# Patient Record
Sex: FEMALE | URBAN - METROPOLITAN AREA
[De-identification: names, ages, dates, MRNs, and addresses within clinical notes are randomized per-mention and may not be internally consistent; named-entity substitution may affect disease eponyms.]

---

## 2019-01-17 ENCOUNTER — OFFICE VISIT (OUTPATIENT)
Dept: PSYCHOLOGY | Facility: CLINIC | Age: 24
End: 2019-01-17
Payer: COMMERCIAL

## 2019-01-17 DIAGNOSIS — F43.22 ADJUSTMENT DISORDER WITH ANXIETY: Primary | ICD-10-CM

## 2019-01-17 ASSESSMENT — ANXIETY QUESTIONNAIRES
6. BECOMING EASILY ANNOYED OR IRRITABLE: NOT AT ALL
3. WORRYING TOO MUCH ABOUT DIFFERENT THINGS: SEVERAL DAYS
1. FEELING NERVOUS, ANXIOUS, OR ON EDGE: MORE THAN HALF THE DAYS
7. FEELING AFRAID AS IF SOMETHING AWFUL MIGHT HAPPEN: SEVERAL DAYS
GAD7 TOTAL SCORE: 6
7. FEELING AFRAID AS IF SOMETHING AWFUL MIGHT HAPPEN: SEVERAL DAYS
GAD7 TOTAL SCORE: 6
5. BEING SO RESTLESS THAT IT IS HARD TO SIT STILL: NOT AT ALL
2. NOT BEING ABLE TO STOP OR CONTROL WORRYING: SEVERAL DAYS
GAD7 TOTAL SCORE: 6
4. TROUBLE RELAXING: SEVERAL DAYS

## 2019-01-17 ASSESSMENT — PATIENT HEALTH QUESTIONNAIRE - PHQ9
SUM OF ALL RESPONSES TO PHQ QUESTIONS 1-9: 5
10. IF YOU CHECKED OFF ANY PROBLEMS, HOW DIFFICULT HAVE THESE PROBLEMS MADE IT FOR YOU TO DO YOUR WORK, TAKE CARE OF THINGS AT HOME, OR GET ALONG WITH OTHER PEOPLE: NOT DIFFICULT AT ALL
SUM OF ALL RESPONSES TO PHQ QUESTIONS 1-9: 5

## 2019-01-17 NOTE — PROGRESS NOTES
Health Psychology  Psychologists:     Lourdes Angel, Ph.D., L.P. [phone]                  Ashish Rivas, Ph.D.,  L.P. [phone]    Emely Peters, Ph.D., L.P. [phone]     Adam Acuna, Ph.D., A.B.P.P., L.P. [phone]    Miranda Mtata, Ph.D., L.P. [phone]          Mailing Address:   Department of Medicine  [facility]  [facility] [address] [place] [address]   Clinical Office:   [facility]  [address] [place] [address]             Health Psychology  Confidential Summary of Psychological Evaluation    Patient: Christie Negron  Patient's YOB: 1995  MRN: [id number]  Psychologist: Ashish Rivas, PhD,   Psychological Evaluation Date: January 17, 2019    Referral Source: Self    Reason for Referral:   This evaluation was conducted in order to assess current psychological functioning and offer treatment recommendations.      Assessment Procedures:   Christie Negron was administered the following psychological assessments:       Mental Status Examination     Clinical Interview     Patient Health Questionnaire (PHQ-9)    Generalized Anxiety Disorder (SOULEYMANE-7) scale    CAGE-Adapted to Include Drugs (CAGE-AID) scale    World Health Organization Disability Assessment Scale (WHODAS) 2.0    Review of Prior Medical Records     Review of Prior Psychological Assessment     History of Presenting Complaint:   Christie Negron reports that she began experiencing symptoms of anxiety at the end of her relationship last Spring and Summer.  At that time she was realizing how dysfunctional it had become and the serious mental health issues her girlfriend had.  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      Social History:   Ms. Negron is a single 23 year old woman who is currently living in an apartment [place] the [place] and  working as a .      Christie grew up in the [place] in a close knit [facility] community.  She describes her early childhood as very stressful because of high conflict between her mother and her father.  When she was a young adolescent, and developed anorexia.  She entered treatment and the family stress was identified as a contributing factor to her eating disorder.  At that time her father moved out of the home, but her parents remained  for another number of years.  And reports that the household was much more comfortable after her father moved out.  She and her younger brother and her mother became very close.    Christie attended a [employer] day school and was very involved in her [employer].  A close friend's father committed suicide when they were about 11 years old.  She later started dating this friend in the [employer] community was involved because of her activity in the [employer].  Her rabbi and others wanted them to be in a relationship which created a strange dynamic.  They stayed together for much of high school and into college.  At that time she let her boyfriend know that she wanted to explore dating women.  She describes their relationship as having been basically supportive.    Christie attended college in [place].  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    Health behaviors:   Alcohol: social  Nicotine: denies  Social Support: good friends, family supportive    Psychiatric History:   Ms. Negron's personal psychiatric history includes anorexia as an early adolescent and anxiety since then.     Mental Status Examination:   Results of the mental status examination revealed an alert individual showing no signs of excessive distractibility.  The patient is 23 year old years old and appears his age.   The patient tracked the  conversation well. The patient was on time and appropriately groomed and dressed.  The patient was cooperative throughout the interview and seemed to honestly respond to questioning. Patient maintained good eye contact and was oriented to person, place, and time. There was no evidence of psychomotor agitation or retardation.  Speech was logical and coherent and normal for rate, volume, and fluency. Vocabulary and grammar skills were suggestive of intellectual functioning within the average range.     The patient's attitude toward the interview was positive and engaged.  The patient's reported her mood was ...  Affect was within the normal range and appropriate to content.  Behavior during interview suggested that patient s memory functioning is intact for both remote and immediate recall. The patient's thought process appeared to be both goal oriented and organized. Thought content revealed no evidence of delusions, paranoia, or suicidal/homicidal ideation. There was no evidence of visual or auditory hallucinations. Level of personal insight and social judgment appeared to be good.  Patient appeared to be motivated for treatment.    Results of Assessments:  Anxiety  The SOULEYMANE-7 is a measure of anxiety and panic symptoms.  Scores on this measure range from 0 to 21 with higher scores reflecting greater levels and frequency of anxiety symptoms.  The patient's score on the SOULEYMANE-7 was in the mild range.    SOULEYMANE-7 SCORE 1/17/2019   Total Score 6 (mild anxiety)   Total Score 6     Depression  The PHQ-9 is a measure of depressive symptoms.  Scores on this measure range from 0 to 27 with higher scores reflecting greater levels and frequency of depressive symptoms.  The patient's score on the PHQ-9 was in the mild range.  PHQ-9 SCORE 1/17/2019   PHQ-9 Total Score MyChart 5 (Mild depression)   PHQ-9 Total Score 5     Alcohol and Drug abuse  The CAGE-AID is a screening tool to assess for symptoms of alcohol or drug abuse or  dependence. Scores on this measure range from 0 to 4, with higher scores reflecting experiences consistent with problem use.       CAGE-AID Total Score 1/17/2019   Total Score 0   Total Score MyChart 0 (A total score of 2 or greater is considered clinically significant)       CAGE-AID score  > 1 is a positive screen, suggesting further discussion is needed to determine if evaluation for alcohol or substance abuse is appropriate.  A score > 2 is considered clinically significant, suggesting further evaluation of alcohol or substance-related problems is indicated.      Disability Assessment  The WHODAS is a disability assessment instrument that is based on the conceptual framework of the International Classification of Functioning, Disability, and Health.   The 12-item version of this scale was administered on this date.     WHODAS 2.0 Total Score 1/17/2019   Total Score 12   Total Score MyChart 12       Summary and Recommendations:   Based on this interview and results from the assessments administered on this date, Christie Negron appears to be experiencing symptoms of anxiety and depressed mood secondary to the events of last Spring and Summer.      Recommendations based on this evaluation include:   1. Begin individual psychotherapy.  A supportive and cognitive behavioral approach will likely be most beneficial initially.  Insight oriented and psychoeducational treatments may also prove beneficial.    2. Consider Bibliotherapy.      These recommendations were discussed with the patient and she is agreeable to treatment.     Diagnosis:    Axis I: Adjustment Disorder with anxiety   Axis II: deferred   Axis III: none reported   Axis IV: interpersonal conflict, professional uncertainty          Ashish Rivas, PhD  Licensed Psychologist  Pager: [phone]    * In accordance with the Rules of the [place] Board of Psychology, it is noted that psychological descriptions and scientific procedures underlying  psychological evaluations have limitations.  Absolute predictions cannot be made based on the information in this report.     Answers for HPI/ROS submitted by the patient on 1/17/2019   SOULEYMANE 7 TOTAL SCORE: 6  If you checked off any problems, how difficult have these problems made it for you to do your work, take care of things at home, or get along with other people?: Not difficult at all  PHQ9 TOTAL SCORE: 5

## 2019-01-18 ASSESSMENT — PATIENT HEALTH QUESTIONNAIRE - PHQ9: SUM OF ALL RESPONSES TO PHQ QUESTIONS 1-9: 5

## 2019-01-18 ASSESSMENT — ANXIETY QUESTIONNAIRES: GAD7 TOTAL SCORE: 6

## 2019-01-25 ENCOUNTER — OFFICE VISIT (OUTPATIENT)
Dept: PSYCHOLOGY | Facility: CLINIC | Age: 24
End: 2019-01-25
Payer: COMMERCIAL

## 2019-01-25 DIAGNOSIS — F43.22 ADJUSTMENT DISORDER WITH ANXIETY: Primary | ICD-10-CM

## 2019-02-01 ENCOUNTER — OFFICE VISIT (OUTPATIENT)
Dept: PSYCHOLOGY | Facility: CLINIC | Age: 24
End: 2019-02-01
Payer: COMMERCIAL

## 2019-02-01 DIAGNOSIS — F43.22 ADJUSTMENT DISORDER WITH ANXIETY: Primary | ICD-10-CM

## 2019-02-07 ENCOUNTER — OFFICE VISIT (OUTPATIENT)
Dept: PSYCHOLOGY | Facility: CLINIC | Age: 24
End: 2019-02-07
Payer: COMMERCIAL

## 2019-02-07 DIAGNOSIS — F43.22 ADJUSTMENT DISORDER WITH ANXIETY: Primary | ICD-10-CM

## 2019-02-14 ENCOUNTER — OFFICE VISIT (OUTPATIENT)
Dept: PSYCHOLOGY | Facility: CLINIC | Age: 24
End: 2019-02-14
Payer: COMMERCIAL

## 2019-02-14 DIAGNOSIS — F43.22 ADJUSTMENT DISORDER WITH ANXIETY: Primary | ICD-10-CM

## 2019-02-21 ENCOUNTER — OFFICE VISIT (OUTPATIENT)
Dept: PSYCHOLOGY | Facility: CLINIC | Age: 24
End: 2019-02-21
Payer: COMMERCIAL

## 2019-02-21 DIAGNOSIS — F43.22 ADJUSTMENT DISORDER WITH ANXIETY: Primary | ICD-10-CM

## 2019-02-21 NOTE — PROGRESS NOTES
"    Health Psychology  Psychologists:     Lourdes Angel, Ph.D., L.P. [phone]                  Ashish Rivas, Ph.D.,  L.P. [phone]    Emely Peters, Ph.D., L.P. [phone]     Adam Acuna, Ph.D., A.B.REBEKAH., L.P. [phone]    Miranda Matta, Ph.D., L.P. [phone]          Mailing Address:   Department of Medicine  [facility]  [facility] [address] [place] [address]   Clinical Office:   [facility]  [address] [place] [address]             Health Psychology  Confidential Summary of Treatment    Patient: Christie Negron  Patient's YOB: 1995  MRN: [id number]  Psychologist: Ashish Rivas, PhD,   Psychological Evaluation Date: January 17, 2019  Confidential Visit Summary    Service: Individual Psychotherapy     Start time:  11:06  Stop time:  12:00  Treatment modality:   Cognitive Behavioral Psychotherapy   Insight Oriented Psychotherapy   Patient s progress on goals:   Met patient in the lobby and escorted to the consultation room for visit.   Patient discussed experiences in college that contributed or reinforced her external focus and identification with being a helper and problem solving.  It also contributed to her being less aware of her own experience and satisfaction.  She attended [employer] and is now waiting tables in a restaurant.  Feels the external judgment of \"not using her fancy degree.\"  She also loves waiting tables and challenge of working with different people and the pleasure of bringing them foods they can enjoy.  She wants to hold on to this job as long as possible.  Talked about the internal and external conflict for her with this discussed possible choices she may make for a future career path.  Talked about balancing what she can do well and what is good for her and her own well-being.  Patient's response to treatment:  Patient is engaged, reflective, and " motivated   She participates fully in sessions and is appearing to benefit from treatment.    Plan: Ongoing individual psychotherapy     Current mental health status:   General appearance:  Appropriate, well kept   Mood: thoughtful, reflective   Affect: Mood Congruent   Cognition: Appropriate for age   Orientation: Times three   Insight: fair to good   Memory: Appropriate long term / Short term   Psychosis: Denies   Suicidal / Homicidal Thoughts: Denies    Goals and Interventions:   Problem Goals   Mood Disorder  Depression  Anxiety   [x] Decrease symptoms  [x] Improve well being  [x] Improve coping  [] Change behavior/cognitions  [] Improve psychosocial support  [] Improve decision making  [] Improve self-care with diet and exercise  [] Improve sleep habits/sleep quality  [] Monitor psychological status     Interventions of Treatment   [x] Fostered healthy therapeutic alliance  [] Addressed unhealthy cognitions  [] Addressed unhealthy behaviors  [] Relaxation training  [x] Psycho-education  [] Bibliotherapy  [] Provided support  [x] Explored/confronted resistance  [x] Developed insights into cognitions/behaviors  [] Identified/Promoted adaptive coping strategies  [] Education/training in sleep   [x] Education/training in mindfulness tools       History of Presenting Complaint:   Christie Negron reports that she began experiencing symptoms of anxiety at the end of her relationship last Spring and Summer.  At that time she was realizing how dysfunctional it had become and the serious mental health issues her girlfriend had.  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        Diagnosis:    Axis I: Adjustment Disorder with anxiety   Axis II: deferred   Axis III: none reported   Axis IV: interpersonal conflict, professional uncertainty          Ashish Rivas, PhD  Licensed Psychologist  Pager: [phone]

## 2019-02-21 NOTE — PROGRESS NOTES
Health Psychology  Psychologists:     Lourdes Angel, Ph.D., L.P. [phone]                  Ashish Rivas, Ph.D.,  L.P. [phone]    Emely Peters, Ph.D., L.P. [phone]     Adam Acuna, Ph.D., A.B.P.P., L.P. [phone]    Miranda Matta, Ph.D., L.P. [phone]          Mailing Address:   Department of Medicine  [facility]  [facility] [address] [place] [address]   Clinical Office:   [facility]  [address] [place] [address]             Health Psychology  Confidential Summary of Treatment    Patient: Christie Negron  Patient's YOB: 1995  MRN: [id number]  Psychologist: Ashish Riavs, PhD,   Psychological Evaluation Date: January 17, 2019  Confidential Visit Summary    Service: Individual Psychotherapy     Start time:  11:06  Stop time:  12:00  Treatment modality:   Cognitive Behavioral Psychotherapy   Insight Oriented Psychotherapy   Patient s progress on goals:   Met patient in the lobby and escorted to the consultation room for visit.   She is processing difficult events from relationship with ex and parallels to her childhood.  Discussed dynamics of her relationship with her mother.  She is developing tools and insights.   Patient's response to treatment:  Patient is engaged, reflective, and motivated   She participates fully in sessions and is appearing to benefit from treatment.    Plan: Ongoing individual psychotherapy     Current mental health status:   General appearance:  Appropriate, well kept   Mood: down   Affect: Mood Congruent   Cognition: Appropriate for age   Orientation: Times three   Insight: fair to good   Memory: Appropriate long term / Short term   Psychosis: Denies   Suicidal / Homicidal Thoughts: Denies    Goals and Interventions:   Problem Goals   Mood Disorder  Depression  Anxiety   [x] Decrease symptoms  [x] Improve well being  [x] Improve coping  [] Change  behavior/cognitions  [] Improve psychosocial support  [] Improve decision making  [] Improve self-care with diet and exercise  [] Improve sleep habits/sleep quality  [] Monitor psychological status     Interventions of Treatment   [x] Fostered healthy therapeutic alliance  [] Addressed unhealthy cognitions  [] Addressed unhealthy behaviors  [] Relaxation training  [x] Psycho-education  [] Bibliotherapy  [] Provided support  [x] Explored/confronted resistance  [x] Developed insights into cognitions/behaviors  [] Identified/Promoted adaptive coping strategies  [] Education/training in sleep   [x] Education/training in mindfulness tools       History of Presenting Complaint:   Christie Negron reports that she began experiencing symptoms of anxiety at the end of her relationship last Spring and Summer.  At that time she was realizing how dysfunctional it had become and the serious mental health issues her girlfriend had.  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        Diagnosis:    Axis I: Adjustment Disorder with anxiety   Axis II: deferred   Axis III: none reported   Axis IV: interpersonal conflict, professional uncertainty          Ashish Rivas, PhD  Licensed Psychologist  Pager: [phone]

## 2019-02-27 NOTE — PROGRESS NOTES
Health Psychology  Psychologists:     Lourdes Angel, Ph.D., L.P. [phone]                  Ashish Rivas, Ph.D.,  L.P. [phone]    Emely Peters, Ph.D., L.P. [phone]     Adam Acuna, Ph.D., A.B.P.P., L.P. [phone]    Miranda Matta, Ph.D., L.P. [phone]          Mailing Address:   Department of Medicine  [facility]  [facility] [address] [place] [address]   Clinical Office:   [facility]  [address] [place] [address]             Health Psychology  Confidential Summary of Psychological Evaluation    Patient: Christie Negron  Patient's YOB: 1995  MRN: [id number]  Psychologist: Ashish Rivas, PhD,   Psychological Evaluation Date: January 17, 2019    Referral Source: Self    History of Presenting Complaint at Intake:   Christie Negron reports that she began experiencing symptoms of anxiety at the end of her relationship last Spring and Summer.  At that time she was realizing how dysfunctional it had become and the serious mental health issues her girlfriend had.  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    Confidential Visit Summary    Service: Individual Psychotherapy     Start time:  12:05  Stop time:  1:00  Treatment modality:   Cognitive Behavioral Psychotherapy   Insight Oriented Psychotherapy   Patient s progress on goals:   Met patient in the lobby and escorted to the consultation room for visit.   Discussed personal and family historical factors as they relate to her current coping and self-care.    Developing tools and insights.     Patient's response to treatment:  Engaged, reflective, and motivated   Participates fully.   Appearing to benefit from treatment.    Plan: Ongoing individual psychotherapy     Current mental health status:   General appearance:  Appropriate, well kept   Mood:  good   Affect: Mood Congruent   Cognition: Appropriate for age   Orientation: Times three   Insight: fair   Memory: Appropriate long term / Short term   Psychosis: Denies   Suicidal / Homicidal Thoughts: Denies    Goals and Interventions:   Problem Goals   Mood Disorder  Depression  Anxiety   [x] Decrease symptoms  [x] Improve well being  [] Improve coping  [] Change behavior/cognitions  [x] Improve psychosocial support  [] Improve decision making  [] Improve self-care with diet and exercise  [] Improve sleep habits/sleep quality  [] Monitor psychological status     Interventions of Treatment   [x] Fostered healthy therapeutic alliance  [] Addressed unhealthy cognitions  [] Addressed unhealthy behaviors  [] Relaxation training  [] Psycho-education  [] Bibliotherapy  [x] Provided support  [x] Explored/confronted resistance  [x] Developed insights into cognitions/behaviors  [x] Identified/Promoted adaptive coping strategies  [] Education/training in sleep   [x] Education/training in mindfulness tools     Diagnosis:    Axis I: Adjustment Disorder with anxiety   Axis II: deferred   Axis III: none reported   Axis IV: interpersonal conflict, professional uncertainty          Ashish Rivas, PhD  Licensed Psychologist  Pager: [phone]

## 2019-02-28 ENCOUNTER — OFFICE VISIT (OUTPATIENT)
Dept: PSYCHOLOGY | Facility: CLINIC | Age: 24
End: 2019-02-28
Payer: COMMERCIAL

## 2019-02-28 DIAGNOSIS — F43.22 ADJUSTMENT DISORDER WITH ANXIETY: Primary | ICD-10-CM

## 2019-02-28 NOTE — PROGRESS NOTES
Health Psychology  Psychologists:     Lourdes Angel, Ph.D., L.P. [phone]                  Ashish Rivas, Ph.D.,  L.P. [phone]    Emely Peters, Ph.D., L.P. [phone]     Adam Acuna, Ph.D., A.B.P.P., L.P. [phone]    Miranda Matta, Ph.D., L.P. [phone]          Mailing Address:   Department of Medicine  [facility]  [facility] [address] [place] [address]   Clinical Office:   [facility]  [address] [place] [address]             Health Psychology  Confidential Summary of Treatment    Patient: Christie Negron  Patient's YOB: 1995  MRN: [id number]  Psychologist: Ashish Rivas, PhD,   Psychological Evaluation Date: January 17, 2019  Confidential Visit Summary    Service: Individual Psychotherapy     Start time:  10:04  Stop time:   11:00  Treatment modality:   Cognitive Behavioral Psychotherapy   Insight Oriented Psychotherapy   Patient s progress on goals:   Met patient in the lobby and escorted to the consultation room for visit.   Patient discussed new relationship and feeling freer after leaving college.  Discussed current experience taking classes at a community college.  Discussed issues of self-care and resilience.  Patient's response to treatment:  Patient is engaged, reflective, and motivated   She participates fully in sessions and is appearing to benefit from treatment.    Plan: Ongoing individual psychotherapy     Current mental health status:   General appearance:  Appropriate, well kept   Mood: fair   Affect: Mood Congruent   Cognition: Appropriate for age   Orientation: Times three   Insight: fair to good   Memory: Appropriate long term / Short term   Psychosis: Denies   Suicidal / Homicidal Thoughts: Denies    Goals and Interventions:   Problem Goals   Mood Disorder  Depression  Anxiety   [x] Decrease symptoms  [x] Improve well being  [x] Improve coping  [] Change  behavior/cognitions  [] Improve psychosocial support  [] Improve decision making  [] Improve self-care with diet and exercise  [] Improve sleep habits/sleep quality  [] Monitor psychological status     Interventions of Treatment   [x] Fostered healthy therapeutic alliance  [] Addressed unhealthy cognitions  [] Addressed unhealthy behaviors  [] Relaxation training  [x] Psycho-education  [] Bibliotherapy  [] Provided support  [x] Explored/confronted resistance  [x] Developed insights into cognitions/behaviors  [] Identified/Promoted adaptive coping strategies  [] Education/training in sleep   [x] Education/training in mindfulness tools       History of Presenting Complaint:   Christie Negron reports that she began experiencing symptoms of anxiety at the end of her relationship last Spring and Summer.  At that time she was realizing how dysfunctional it had become and the serious mental health issues her girlfriend had.  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        Diagnosis:    Axis I: Adjustment Disorder with anxiety   Axis II: deferred   Axis III: none reported   Axis IV: interpersonal conflict, professional uncertainty          Ashish Rivas, PhD  Licensed Psychologist  Pager: [phone]

## 2019-03-06 NOTE — PROGRESS NOTES
Health Psychology  Psychologists:     Lourdes Angel, Ph.D., L.P. [phone]                  Ashish Rivas, Ph.D.,  L.P. [phone]    Emely Peters, Ph.D., L.P. [phone]     Adam Acuna, Ph.D., A.B.P.P., L.P. [phone]    Miranda Matta, Ph.D., L.P. [phone]          Mailing Address:   Department of Medicine  [facility]  [facility] [address] [place] [address]   Clinical Office:   [facility]  [address] [place] [address]             Health Psychology  Confidential Summary of Treatment    Patient: Christie Negron  Patient's YOB: 1995  MRN: [id number]  Psychologist: Ashish Rivas, PhD,   Psychological Evaluation Date: January 17, 2019  Confidential Visit Summary    Service: Individual Psychotherapy     Start time:  11:05  Stop time:  12:00  Treatment modality:   Cognitive Behavioral Psychotherapy   Insight Oriented Psychotherapy   Patient s progress on goals:   Met patient in the lobby and escorted to the consultation room for visit.   She discussed a current relationship and how it feels potentially different from past relationships.  Discussed patterns she can see in past relationships and in her own family history.  Discussed attachment with her mother and feelings of responsibility to be back in [place], though she also wants to be here.  Talked about issues of possible enmeshment.   Patient's response to treatment:  Patient is engaged, reflective, and motivated   She participates fully in sessions and is appearing to benefit from treatment.    Plan: Ongoing individual psychotherapy     Current mental health status:   General appearance:  Appropriate, well kept   Mood: okay   Affect: Mood Congruent   Cognition: Appropriate for age   Orientation: Times three   Insight: fair to good   Memory: Appropriate long term / Short term   Psychosis: Denies   Suicidal / Homicidal Thoughts:  Denies    Goals and Interventions:   Problem Goals   Mood Disorder  Depression  Anxiety   [x] Decrease symptoms  [x] Improve well being  [x] Improve coping  [] Change behavior/cognitions  [] Improve psychosocial support  [] Improve decision making  [] Improve self-care with diet and exercise  [] Improve sleep habits/sleep quality  [] Monitor psychological status     Interventions of Treatment   [x] Fostered healthy therapeutic alliance  [] Addressed unhealthy cognitions  [] Addressed unhealthy behaviors  [] Relaxation training  [x] Psycho-education  [] Bibliotherapy  [] Provided support  [x] Explored/confronted resistance  [x] Developed insights into cognitions/behaviors  [] Identified/Promoted adaptive coping strategies  [] Education/training in sleep   [x] Education/training in mindfulness tools       History of Presenting Complaint:   Christie Negron reports that she began experiencing symptoms of anxiety at the end of her relationship last Spring and Summer.  At that time she was realizing how dysfunctional it had become and the serious mental health issues her girlfriend had.  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        Diagnosis:    Axis I: Adjustment Disorder with anxiety   Axis II: deferred   Axis III: none reported   Axis IV: interpersonal conflict, professional uncertainty          Ashish Rivas, PhD  Licensed Psychologist  Pager: [phone]

## 2019-03-07 ENCOUNTER — OFFICE VISIT (OUTPATIENT)
Dept: PSYCHOLOGY | Facility: CLINIC | Age: 24
End: 2019-03-07
Payer: COMMERCIAL

## 2019-03-07 DIAGNOSIS — F43.22 ADJUSTMENT DISORDER WITH ANXIETY: Primary | ICD-10-CM

## 2019-03-07 NOTE — PROGRESS NOTES
Health Psychology  Psychologists:     Lourdes Angel, Ph.D., L.P. [phone]                  Ashish Rivas, Ph.D.,  L.P. [phone]    Emely Peters, Ph.D., L.P. [phone]     Adam Acuna, Ph.D., A.B.SHIRLEY.SHIRLEY., L.P. [phone]    Miranda Matta, Ph.D., L.P. [phone]          Mailing Address:   Department of Medicine  [facility]  [facility] [address]  [address] [place] [address]   Clinical Office:   [facility]  [address] [place] [address]             Health Psychology  Confidential Summary of Treatment    Patient: Christie Negron  Patient's YOB: 1995  MRN: [id number]  Psychologist: Ashish Rivas, PhD,   Psychological Evaluation Date: January 17, 2019  Confidential Visit Summary    Service: Individual Psychotherapy     Start time:  10:10  Stop time:  10:59  Treatment modality:   Cognitive Behavioral Psychotherapy   Insight Oriented Psychotherapy   Patient s progress on goals:   Met patient in the lobby and escorted to the consultation room for visit.   Patient reports feeling overall better, but still worrying and needing to talk through aspects of her break up with her girlfriend last summer.  She talked about her girlfriend's declining mental health and how this played out in their relationship. Discussed self-care at the time.  She had been investing in her GF for about a year or more as GF struggled to keep up with basic post-college expectations.  Patient reports that though she was no longer feeling like the relationship was good for her, she was hooked in to being a helper and encouraging.    Discussed her pleasure with the mental stimulation she gets with her job.    Patient's response to treatment:  Patient is engaged, reflective, and motivated   She participates fully in sessions and is appearing to benefit from treatment.    Plan: Ongoing individual psychotherapy     Current mental health  status:   General appearance:  Appropriate, well kept   Mood: thoughtful, reflective   Affect: Mood Congruent   Cognition: Appropriate for age   Orientation: Times three   Insight: fair to good   Memory: Appropriate long term / Short term   Psychosis: Denies   Suicidal / Homicidal Thoughts: Denies    Goals and Interventions:   Problem Goals   Mood Disorder  Depression  Anxiety   [x] Decrease symptoms  [x] Improve well being  [x] Improve coping  [] Change behavior/cognitions  [] Improve psychosocial support  [] Improve decision making  [] Improve self-care with diet and exercise  [] Improve sleep habits/sleep quality  [] Monitor psychological status     Interventions of Treatment   [x] Fostered healthy therapeutic alliance  [] Addressed unhealthy cognitions  [] Addressed unhealthy behaviors  [] Relaxation training  [x] Psycho-education  [] Bibliotherapy  [] Provided support  [x] Explored/confronted resistance  [x] Developed insights into cognitions/behaviors  [] Identified/Promoted adaptive coping strategies  [] Education/training in sleep   [x] Education/training in mindfulness tools       History of Presenting Complaint:   Christie Negron reports that she began experiencing symptoms of anxiety at the end of her relationship last Spring and Summer.  At that time she was realizing how dysfunctional it had become and the serious mental health issues her girlfriend had.  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        Diagnosis:    Axis I: Adjustment Disorder with anxiety   Axis II: deferred   Axis III: none reported   Axis IV: interpersonal conflict, professional uncertainty          Ashish Rivas, PhD  Licensed Psychologist  Pager: [phone]

## 2019-03-15 NOTE — PROGRESS NOTES
Health Psychology  Psychologists:     Lourdes Angel, Ph.D., L.P. [phone]                  Ashish Rivas, Ph.D.,  L.P. [phone]    Emely Peters, Ph.D., L.P. [phone]     Adam Acuna, Ph.D., A.B.P.P., L.P. [phone]    Miranda Matta, Ph.D., L.P. [phone]          Mailing Address:   Department of Medicine  [facility]  [facility] [address] [place] [address]   Clinical Office:   [facility]  [address] [place] [address]             Health Psychology  Confidential Summary of Treatment    Patient: Christie Negron  Patient's YOB: 1995  MRN: [id number]  Psychologist: Ashish Rivas, PhD,   Psychological Evaluation Date: January 17, 2019  Confidential Visit Summary    Service: Individual Psychotherapy     Start time:  10:06  Stop time:  11:00  Treatment modality:   Cognitive Behavioral Psychotherapy   Insight Oriented Psychotherapy   Patient s progress on goals:   Met patient in the lobby and escorted to the consultation room for visit.   Christie is making progress with processing and improving her understanding of past challenges in her relationship and personal self-care.  Processed issues related to her life goals and her current enjoyment as  at an upscale restaurant.   Patient's response to treatment:  Patient is engaged, reflective, and motivated   She participates fully in sessions and is appearing to benefit from treatment.    Plan: Ongoing individual psychotherapy     Current mental health status:   General appearance:  Appropriate, well kept   Mood: worried   Affect: Mood Congruent   Cognition: Appropriate for age   Orientation: Times three   Insight: fair to good   Memory: Appropriate long term / Short term   Psychosis: Denies   Suicidal / Homicidal Thoughts: Denies    Goals and Interventions:   Problem Goals   Mood Disorder  Depression  Anxiety   [x] Decrease  symptoms  [x] Improve well being  [x] Improve coping  [] Change behavior/cognitions  [] Improve psychosocial support  [] Improve decision making  [] Improve self-care with diet and exercise  [] Improve sleep habits/sleep quality  [] Monitor psychological status     Interventions of Treatment   [x] Fostered healthy therapeutic alliance  [] Addressed unhealthy cognitions  [] Addressed unhealthy behaviors  [] Relaxation training  [x] Psycho-education  [] Bibliotherapy  [] Provided support  [x] Explored/confronted resistance  [x] Developed insights into cognitions/behaviors  [] Identified/Promoted adaptive coping strategies  [] Education/training in sleep   [x] Education/training in mindfulness tools       History of Presenting Complaint:   Christie Negron reports that she began experiencing symptoms of anxiety at the end of her relationship last Spring and Summer.  At that time she was realizing how dysfunctional it had become and the serious mental health issues her girlfriend had.  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        Diagnosis:    Axis I: Adjustment Disorder with anxiety   Axis II: deferred   Axis III: none reported   Axis IV: interpersonal conflict, professional uncertainty          Ashish Rivas, PhD  Licensed Psychologist  Pager: [phone]

## 2019-04-08 ENCOUNTER — OFFICE VISIT (OUTPATIENT)
Dept: PSYCHOLOGY | Facility: CLINIC | Age: 24
End: 2019-04-08
Payer: COMMERCIAL

## 2019-04-08 DIAGNOSIS — F43.22 ADJUSTMENT DISORDER WITH ANXIETY: Primary | ICD-10-CM

## 2019-04-18 ENCOUNTER — OFFICE VISIT (OUTPATIENT)
Dept: PSYCHOLOGY | Facility: CLINIC | Age: 24
End: 2019-04-18
Payer: COMMERCIAL

## 2019-04-18 DIAGNOSIS — F43.22 ADJUSTMENT DISORDER WITH ANXIETY: Primary | ICD-10-CM

## 2019-04-25 ENCOUNTER — OFFICE VISIT (OUTPATIENT)
Dept: PSYCHOLOGY | Facility: CLINIC | Age: 24
End: 2019-04-25
Payer: COMMERCIAL

## 2019-04-25 DIAGNOSIS — F41.9 ANXIETY DISORDER, UNSPECIFIED TYPE: Primary | ICD-10-CM

## 2019-05-06 NOTE — PROGRESS NOTES
Health Psychology  Psychologists:     Lourdes Angel, Ph.D., L.P. [phone]                  Ashish Rivas, Ph.D.,  L.P. [phone]    Emely Peters, Ph.D., L.P. [phone]     Adam Acuna, Ph.D., A.B.P.P., L.P. [phone]    Miranda Matta, Ph.D., L.P. [phone]          Mailing Address:   Department of Medicine  [facility]  [facility] [address] [place] [address]   Clinical Office:   [facility]  [address] [place] [address]             Health Psychology  Confidential Summary of Treatment    Patient: Christie Negron  Patient's YOB: 1995  MRN: [id number]  Psychologist: Ashish Rivas, PhD,   Psychological Evaluation Date: January 17, 2019  Confidential Visit Summary    Service: Individual Psychotherapy     Start time:  12:05  Stop time:    1:00  Treatment modality:   Cognitive Behavioral Psychotherapy   Insight Oriented Psychotherapy   Patient s progress on goals:   Met patient in the lobby and escorted to the consultation room for visit.   Feels she is making good progress in her self-care and in accepting her decisions and having more compassion for the challenges of contexts she has been in throughout the past.  Discussed progress with self-care and resiliency.  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  Talked also about feeling more confident about applying for graduate programs.  Talked about what she would like to do professionally in the future.   Patient's response to treatment:  Patient is engaged, reflective, and motivated   She participates fully in sessions and is appearing to benefit from treatment.    Plan: Ongoing individual psychotherapy     Current mental health status:   General appearance:  Appropriate, well kept   Mood: good   Affect: Mood  Congruent   Cognition: Appropriate for age   Orientation: Times three   Insight: fair to good   Memory: Appropriate long term / Short term   Psychosis: Denies   Suicidal / Homicidal Thoughts: Denies    Goals and Interventions:   Problem Goals   Mood Disorder  Depression  Anxiety   [x] Decrease symptoms  [x] Improve well being  [x] Improve coping  [] Change behavior/cognitions  [] Improve psychosocial support  [] Improve decision making  [] Improve self-care with diet and exercise  [] Improve sleep habits/sleep quality  [] Monitor psychological status     Interventions of Treatment   [x] Fostered healthy therapeutic alliance  [] Addressed unhealthy cognitions  [] Addressed unhealthy behaviors  [] Relaxation training  [x] Psycho-education  [] Bibliotherapy  [] Provided support  [x] Explored/confronted resistance  [x] Developed insights into cognitions/behaviors  [] Identified/Promoted adaptive coping strategies  [] Education/training in sleep   [x] Education/training in mindfulness tools       History of Presenting Complaint from Intake:   Christie Negron reports that she began experiencing symptoms of anxiety at the end of her relationship last Spring and Summer.  At that time she was realizing how dysfunctional it had become and the serious mental health issues her girlfriend had.  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        Diagnosis:    Axis I: Anxiety disorder, unspecified   Axis II: deferred   Axis III: none reported   Axis IV: interpersonal conflict, professional uncertainty          Ashish Rivas, PhD  Licensed Psychologist  Pager: [phone]

## 2019-05-06 NOTE — PROGRESS NOTES
Health Psychology  Psychologists:     Lourdes Angel, Ph.D., L.P. [phone]                  Ashish Rivas, Ph.D.,  L.P. [phone]    Emely Peters, Ph.D., L.P. [phone]     Adam Acuna, Ph.D., A.B.P.P., L.P. [phone]    Miranda Matta, Ph.D., L.P. [phone]          Mailing Address:   Department of Medicine  [facility]  [facility] [address] [place] [address]   Clinical Office:   [facility]  [address] [place] [address]             Health Psychology  Confidential Summary of Treatment    Patient: Christie Negron  Patient's YOB: 1995  MRN: [id number]  Psychologist: Ashish Rivas, PhD,   Psychological Evaluation Date: January 17, 2019  Confidential Visit Summary    Service: Individual Psychotherapy     Start time:  12:05  Stop time:    1:00  Treatment modality:   Cognitive Behavioral Psychotherapy   Insight Oriented Psychotherapy   Patient s progress on goals:   Met patient in the [facility] and escorted to the consultation room for visit.   Discussed visit to [place].  Patient reports feeling more comfortable and at ease about past romantic relationship.  Feels also that she is getting to a better emotional place. Discussed ways in which she has been more accepting and respectful about her own feelings and better understanding her own process.   Patient's response to treatment:  Patient is engaged, reflective, and motivated   She participates fully in sessions and is appearing to benefit from treatment.    Plan: Ongoing individual psychotherapy     Current mental health status:   General appearance:  Appropriate, well kept   Mood: good   Affect: Mood Congruent   Cognition: Appropriate for age   Orientation: Times three   Insight: fair to good   Memory: Appropriate long term / Short term   Psychosis: Denies   Suicidal / Homicidal Thoughts: Denies    Goals and Interventions:   Problem  Goals   Mood Disorder  Depression  Anxiety   [x] Decrease symptoms  [x] Improve well being  [x] Improve coping  [] Change behavior/cognitions  [] Improve psychosocial support  [] Improve decision making  [] Improve self-care with diet and exercise  [] Improve sleep habits/sleep quality  [] Monitor psychological status     Interventions of Treatment   [x] Fostered healthy therapeutic alliance  [] Addressed unhealthy cognitions  [] Addressed unhealthy behaviors  [] Relaxation training  [x] Psycho-education  [] Bibliotherapy  [] Provided support  [x] Explored/confronted resistance  [x] Developed insights into cognitions/behaviors  [] Identified/Promoted adaptive coping strategies  [] Education/training in sleep   [x] Education/training in mindfulness tools       History of Presenting Complaint:   Christie Negron reports that she began experiencing symptoms of anxiety at the end of her relationship last Spring and Summer.  At that time she was realizing how dysfunctional it had become and the serious mental health issues her girlfriend had.  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        Diagnosis:    Axis I: Adjustment Disorder with anxiety   Axis II: deferred   Axis III: none reported   Axis IV: interpersonal conflict, professional uncertainty          Ashish Rivas, PhD  Licensed Psychologist  Pager: [phone]

## 2019-05-09 ENCOUNTER — OFFICE VISIT (OUTPATIENT)
Dept: PSYCHOLOGY | Facility: CLINIC | Age: 24
End: 2019-05-09
Payer: COMMERCIAL

## 2019-05-09 DIAGNOSIS — F41.9 ANXIETY DISORDER, UNSPECIFIED TYPE: Primary | ICD-10-CM

## 2019-05-09 DIAGNOSIS — F43.22 ADJUSTMENT DISORDER WITH ANXIETY: ICD-10-CM

## 2019-05-21 NOTE — PROGRESS NOTES
Health Psychology  Psychologists:     Lourdes Angel, Ph.D., L.P. [phone]                  Ashish Rivas, Ph.D.,  L.P. [phone]    Emely Peters, Ph.D., L.P. [phone]     Adam Acuna, Ph.D., A.B.P.P., L.P. [phone]    Miranda Matta, Ph.D., L.P. [phone]          Mailing Address:   Department of Medicine  [facility]  [facility] [address] [place] [address]   Clinical Office:   [facility]  [address] [place] [address]             Health Psychology  Confidential Summary of Treatment    Patient: Christie Negron  Patient's YOB: 1995  MRN: [id number]  Psychologist: Ashish Rivas, PhD,   Psychological Evaluation Date: January 17, 2019  Confidential Visit Summary    Service: Individual Psychotherapy     Start time:  10:06  Stop time:   11:00  Treatment modality:   Cognitive Behavioral Psychotherapy   Insight Oriented Psychotherapy   Patient s progress on goals:   Met patient in the lobby and escorted to the consultation room for visit.   Discussed progress with her mental health, feeling more comfortable and more confident about her resilience.  Feeling good about how she is navigating relationships.  Discussed insights that have been helpful about past experiences, especially with ex-girlfriend.   Patient's response to treatment:  Patient is engaged, reflective, and motivated   She participates fully in sessions and is appearing to benefit from treatment.  Plan: Ongoing individual psychotherapy   Current mental health status:   General appearance:  Appropriate, well kept   Mood: Good   Affect: Mood Congruent   Cognition: Appropriate for age   Orientation: Times three   Insight: fair to good   Memory: Appropriate long term / Short term   Psychosis: Denies   Suicidal / Homicidal Thoughts: Denies    Goals and Interventions:   Problem Goals   Mood Disorder  Depression  Anxiety   [x]  Decrease symptoms  [x] Improve well being  [x] Improve coping  [] Change behavior/cognitions  [] Improve psychosocial support  [] Improve decision making  [] Improve self-care with diet and exercise  [] Improve sleep habits/sleep quality  [] Monitor psychological status     Interventions of Treatment   [x] Fostered healthy therapeutic alliance  [] Addressed unhealthy cognitions  [] Addressed unhealthy behaviors  [] Relaxation training  [x] Psycho-education  [] Bibliotherapy  [] Provided support  [x] Explored/confronted resistance  [x] Developed insights into cognitions/behaviors  [] Identified/Promoted adaptive coping strategies  [] Education/training in sleep   [x] Education/training in mindfulness tools       History of Presenting Complaint:   Christie Negron reports that she began experiencing symptoms of anxiety at the end of her relationship last Spring and Summer.  At that time she was realizing how dysfunctional it had become and the serious mental health issues her girlfriend had.  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        Diagnosis:    Axis I: Adjustment Disorder with anxiety   Axis II: deferred   Axis III: none reported   Axis IV: interpersonal conflict, professional uncertainty          Ashish Rivas, PhD  Licensed Psychologist  Pager: [phone]

## 2019-05-23 ENCOUNTER — OFFICE VISIT (OUTPATIENT)
Dept: PSYCHOLOGY | Facility: CLINIC | Age: 24
End: 2019-05-23
Payer: COMMERCIAL

## 2019-05-23 DIAGNOSIS — F41.9 ANXIETY DISORDER, UNSPECIFIED TYPE: Primary | ICD-10-CM

## 2019-06-06 ENCOUNTER — OFFICE VISIT (OUTPATIENT)
Dept: PSYCHOLOGY | Facility: CLINIC | Age: 24
End: 2019-06-06
Payer: COMMERCIAL

## 2019-06-06 DIAGNOSIS — F41.9 ANXIETY DISORDER, UNSPECIFIED TYPE: Primary | ICD-10-CM

## 2019-06-06 NOTE — PROGRESS NOTES
Health Psychology  Psychologists:     Lourdes Angel, Ph.D., L.P. [phone]                  Ashish Rivas, Ph.D.,  L.P. [phone]    Emely Peters, Ph.D., L.P. [phone]     Adam Acuna, Ph.D., A.B.P.P., L.P. [phone]    Miranda Matta, Ph.D., L.P. [phone]          Mailing Address:   Department of Medicine  [facility]  [facility] [address] [place] [address]   Clinical Office:   [facility]  [address] [place] [address]             Health Psychology  Confidential Summary of Treatment    Patient: Christie Negron  Patient's YOB: 1995  MRN: [id number]  Psychologist: Ashish Rivas, PhD,   Psychological Evaluation Date: January 17, 2019  Confidential Visit Summary    Service: Individual Psychotherapy     Start time:  10:06  Stop time:   11:00  Treatment modality:   Cognitive Behavioral Psychotherapy   Insight Oriented Psychotherapy   Patient s progress on goals:   Met patient in the lobby and escorted to the consultation room for visit.   Reports ongoing progress with developing insights, creating healthy routines and self-care.  Talked about current relationship and work situations.  Discussed work plans for later this summer and opening herself up to the idea that she can be successful in graduate school.  This has motivated her to begin looking at master's, certificate, and doctoral programs.  She wants to stay local.   Discussed issues of self-care and maintaining habits as schedules change.   Patient's response to treatment:  Patient is engaged, reflective, and motivated   She participates fully in sessions and is appearing to benefit from treatment.    Plan: Ongoing individual psychotherapy     Current mental health status:   General appearance:  Appropriate, well kept   Mood: Good, some worry   Affect: Mood Congruent   Cognition: Appropriate for age   Orientation: Times  three   Insight: fair to good   Memory: Appropriate long term / Short term   Psychosis: Denies   Suicidal / Homicidal Thoughts: Denies    Goals and Interventions:   Problem Goals   Mood Disorder  Depression  Anxiety   [x] Decrease symptoms  [x] Improve well being  [x] Improve coping  [] Change behavior/cognitions  [] Improve psychosocial support  [] Improve decision making  [] Improve self-care with diet and exercise  [] Improve sleep habits/sleep quality  [] Monitor psychological status     Interventions of Treatment   [x] Fostered healthy therapeutic alliance  [] Addressed unhealthy cognitions  [] Addressed unhealthy behaviors  [] Relaxation training  [x] Psycho-education  [] Bibliotherapy  [] Provided support  [x] Explored/confronted resistance  [x] Developed insights into cognitions/behaviors  [] Identified/Promoted adaptive coping strategies  [] Education/training in sleep   [x] Education/training in mindfulness tools       History of Presenting Complaint from Intake:   Christie Negron reports that she began experiencing symptoms of anxiety at the end of her relationship last Spring and Summer.  At that time she was realizing how dysfunctional it had become and the serious mental health issues her girlfriend had.  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        Diagnosis:    Axis I: Anxiety disorder, unspecified   Axis II: deferred   Axis III: none reported   Axis IV: interpersonal conflict, professional uncertainty          Ashish Rivas, PhD  Licensed Psychologist  Pager: [phone]

## 2019-06-10 ENCOUNTER — OFFICE VISIT (OUTPATIENT)
Dept: PSYCHOLOGY | Facility: CLINIC | Age: 24
End: 2019-06-10
Payer: COMMERCIAL

## 2019-06-10 DIAGNOSIS — F43.22 ADJUSTMENT DISORDER WITH ANXIETY: ICD-10-CM

## 2019-06-10 DIAGNOSIS — F41.9 ANXIETY DISORDER, UNSPECIFIED TYPE: Primary | ICD-10-CM

## 2019-06-12 NOTE — PROGRESS NOTES
Health Psychology  Psychologists:     Lourdes Angel, Ph.D., L.P. [phone]                  Ashish Rivas, Ph.D.,  L.P. [phone]    Emely Peters, Ph.D., L.P. [phone]     Adam Acuna, Ph.D., A.B.P.P., L.P. [phone]    Miranda Matta, Ph.D., L.P. [phone]          Mailing Address:   Department of Medicine  [facility]  [facility] [address] [place] [address]  [id number]   Clinical Office:   [facility]  [address] [place] [address]             Health Psychology  Confidential Summary of Treatment    Patient: Christie Negron  Patient's YOB: 1995  MRN: [id number]  Psychologist: Ashish Rivas, PhD,   Psychological Evaluation Date: January 17, 2019  Confidential Visit Summary    Service: Individual Psychotherapy     Start time:  8:15  Stop time:  9:05  Treatment modality:   Cognitive Behavioral Psychotherapy   Insight Oriented Psychotherapy   Patient s progress on goals:   Met patient in the lobby and escorted to the consultation room for visit.   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  She has decided to ask for what she wants from the owner/.  She has decided to focus on a positive approach as she wants to maintain a good relationship.  Talked about how hard this has hit her.  Her mother wonders if going to an all women's college was a factor in her strong reaction to recognizing that bias based on gender is a part of her workplace and how she is treated.  Reports she is feeling stronger.    Used role play to practice talking to the owner.   Patient's response to treatment:  Patient is engaged, reflective, and motivated   She participates fully in sessions and is appearing to benefit from treatment.    Plan: Ongoing individual psychotherapy     Current mental health status:   General appearance:  Appropriate, well  kept   Mood: Angry, optimistic    Affect: Mood congruent   Cognition: Appropriate for age   Orientation: Times three   Insight: fair to good   Memory: Appropriate long term / Short term   Psychosis: Denies   Suicidal / Homicidal Thoughts: Denies    Goals and Interventions:   Problem Goals   Mood Disorder  Depression  Anxiety   [x] Decrease symptoms  [x] Improve well being  [x] Improve coping  [] Change behavior/cognitions  [] Improve psychosocial support  [] Improve decision making  [] Improve self-care with diet and exercise  [] Improve sleep habits/sleep quality  [] Monitor psychological status     Interventions of Treatment   [x] Fostered healthy therapeutic alliance  [] Addressed unhealthy cognitions  [] Addressed unhealthy behaviors  [] Relaxation training  [x] Psycho-education  [] Bibliotherapy  [] Provided support  [x] Explored/confronted resistance  [x] Developed insights into cognitions/behaviors  [] Identified/Promoted adaptive coping strategies  [] Education/training in sleep   [x] Education/training in mindfulness tools       History of Presenting Complaint from Intake:   Christie Negron reports that she began experiencing symptoms of anxiety at the end of her relationship last Spring and Summer.  At that time she was realizing how dysfunctional it had become and the serious mental health issues her girlfriend had.  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        Diagnosis:    Axis I: Anxiety disorder, unspecified   Axis II: deferred   Axis III: none reported   Axis IV: interpersonal conflict, professional uncertainty          Ashish Rivas, PhD  Licensed Psychologist  Pager: [phone]

## 2019-06-20 ENCOUNTER — OFFICE VISIT (OUTPATIENT)
Dept: PSYCHOLOGY | Facility: CLINIC | Age: 24
End: 2019-06-20
Payer: COMMERCIAL

## 2019-06-20 DIAGNOSIS — F41.9 ANXIETY DISORDER, UNSPECIFIED TYPE: Primary | ICD-10-CM

## 2019-06-20 NOTE — PROGRESS NOTES
Health Psychology  Psychologists:     Lourdes Angel, Ph.D., L.P. [phone]                  Ashish Rivas, Ph.D.,  L.P. [phone]    Emely Peters, Ph.D., L.P. [phone]     Adam Acuna, Ph.D., A.B.P.P., L.P. [phone]    Miranda Matta, Ph.D., L.P. [phone]          Mailing Address:   Department of Medicine  [facility]  [facility] [address] [place] [address]   Clinical Office:   [facility]  [address] [place] [address]             Health Psychology  Confidential Summary of Treatment    Patient: Christie Negron  Patient's YOB: 1995  MRN: [id number]  Psychologist: Ashish Rivas, PhD,   Psychological Evaluation Date: January 17, 2019  Confidential Visit Summary    Service: Individual Psychotherapy     Start time:  10:05  Stop time:  10:59  Treatment modality:   Cognitive Behavioral Psychotherapy   Insight Oriented Psychotherapy   Patient s progress on goals:   Met patient in the lobby and escorted to the consultation room for visit.   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  Discussed her planning and how she was able to present things assertively.  She did not raise any issues of bias.  She is feeling more comfortable, though not fully satisfied that she did get a raise, though not as much as she had been asking for.    Discussed other topics including graduate school and her interest in starting this process gradually.   Patient's response to treatment:  Patient is engaged, reflective, and motivated   She participates fully in sessions and is appearing to benefit from treatment.    Plan: Ongoing individual psychotherapy     Current mental health status:   General appearance:  Appropriate, well kept   Mood: Accepting, hopeful   Affect: Mood congruent   Cognition: Appropriate for age   Orientation: Times three   Insight: fair to good   Memory:  Appropriate long term / Short term   Psychosis: Denies   Suicidal / Homicidal Thoughts: Denies    Goals and Interventions:   Problem Goals   Mood Disorder  Depression  Anxiety   [x] Decrease symptoms  [x] Improve well being  [x] Improve coping  [] Change behavior/cognitions  [] Improve psychosocial support  [] Improve decision making  [] Improve self-care with diet and exercise  [] Improve sleep habits/sleep quality  [] Monitor psychological status     Interventions of Treatment   [x] Fostered healthy therapeutic alliance  [] Addressed unhealthy cognitions  [] Addressed unhealthy behaviors  [] Relaxation training  [x] Psycho-education  [] Bibliotherapy  [] Provided support  [x] Explored/confronted resistance  [x] Developed insights into cognitions/behaviors  [] Identified/Promoted adaptive coping strategies  [] Education/training in sleep   [x] Education/training in mindfulness tools       History of Presenting Complaint from Intake:   Christie Negron reports that she began experiencing symptoms of anxiety at the end of her relationship last Spring and Summer.  At that time she was realizing how dysfunctional it had become and the serious mental health issues her girlfriend had.  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        Diagnosis:    Axis I: Anxiety disorder, unspecified   Axis II: deferred   Axis III: none reported   Axis IV: interpersonal conflict, professional uncertainty          Ashish Rivas, PhD  Licensed Psychologist  Pager: [phone]

## 2019-06-20 NOTE — PROGRESS NOTES
"    Health Psychology  Psychologists:     Lourdes Angel, Ph.D., L.P. [phone]                  Ashish Rivas, Ph.D.,  L.P. [phone]    Emely Peters, Ph.D., L.P. [phone]     Adam Acuna, Ph.D., A.B.P.P., L.P. [phone]    Miranda Matta, Ph.D., L.P. [phone]          Mailing Address:   Department of Medicine  [facility]  [facility] [address] [place] [address]   Clinical Office:   [facility]  [address] [place] [address]             Health Psychology  Confidential Summary of Treatment    Patient: Christie Negron  Patient's YOB: 1995  MRN: [id number]  Psychologist: Ashish Rivas, PhD,   Psychological Evaluation Date: January 17, 2019  Confidential Visit Summary    Service: Individual Psychotherapy     Start time:  10:08  Stop time:   11:00  Treatment modality:   Cognitive Behavioral Psychotherapy   Insight Oriented Psychotherapy   Patient s progress on goals:   Met patient in the lobby and escorted to the consultation room for visit.   Discussed Sarika's confidence with her academic and professional life.  Discussed changes at work and impacts on her.  Talked about her exploration of psychology as a field and how to find the right \"niche.\"    Discussed issues of self-care and maintaining habits as schedules change.   Patient's response to treatment:  Patient is engaged, reflective, and motivated   She participates fully in sessions and is appearing to benefit from treatment.    Plan: Ongoing individual psychotherapy     Current mental health status:   General appearance:  Appropriate, well kept   Mood: Generally good, episodes of anxiety which are mostly well managed   Affect: Mood Congruent   Cognition: Appropriate for age   Orientation: Times three   Insight: fair to good   Memory: Appropriate long term / Short term   Psychosis: Denies   Suicidal / Homicidal Thoughts: " Denies    Goals and Interventions:   Problem Goals   Mood Disorder  Depression  Anxiety   [x] Decrease symptoms  [x] Improve well being  [x] Improve coping  [] Change behavior/cognitions  [] Improve psychosocial support  [] Improve decision making  [] Improve self-care with diet and exercise  [] Improve sleep habits/sleep quality  [] Monitor psychological status     Interventions of Treatment   [x] Fostered healthy therapeutic alliance  [] Addressed unhealthy cognitions  [] Addressed unhealthy behaviors  [] Relaxation training  [x] Psycho-education  [] Bibliotherapy  [] Provided support  [x] Explored/confronted resistance  [x] Developed insights into cognitions/behaviors  [] Identified/Promoted adaptive coping strategies  [] Education/training in sleep   [x] Education/training in mindfulness tools       History of Presenting Complaint from Intake:   Christie Negron reports that she began experiencing symptoms of anxiety at the end of her relationship last Spring and Summer.  At that time she was realizing how dysfunctional it had become and the serious mental health issues her girlfriend had.  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        Diagnosis:    Axis I: Anxiety disorder, unspecified   Axis II: deferred   Axis III: none reported   Axis IV: interpersonal conflict, professional uncertainty          Ashish Rivas, PhD  Licensed Psychologist  Pager: [phone]

## 2019-06-27 NOTE — PROGRESS NOTES
Health Psychology  Psychologists:     Lourdes Angel, Ph.D., L.P. [phone]                  Ashish Rivas, Ph.D.,  L.P. [phone]    Emely Peters, Ph.D., L.P. [phone]     Adam Acuna, Ph.D., A.B.P.P., L.P. [phone]    Miranda Matta, Ph.D., L.P. [phone]          Mailing Address:   Department of Medicine  [facility]  [facility] [address] [place] [address]   Clinical Office:   [facility]  [address] [place] [address]             Health Psychology  Confidential Summary of Treatment    Patient: Christie Negron  Patient's YOB: 1995  MRN: [id number]  Psychologist: Ashish Rivas, PhD,   Psychological Evaluation Date: January 17, 2019  Confidential Visit Summary    Service: Individual Psychotherapy     Start time:  10:04  Stop time:   11:01  Treatment modality:   Cognitive Behavioral Psychotherapy   Insight Oriented Psychotherapy   Patient s progress on goals:   Met patient in the lobby and escorted to the consultation room for visit.   Discussed patient's recent experiences at work.  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  She was able to describe some very specific events in which she was challenged or her contributions were dismissed.  Also, she was promised a raise months ago which never materialized.  Others (male) have gotten raises since then. She was shaken up by the bias.  Talked about how she was protected from gender bias in her all female college.  Discussed her options and agreed to meet again in a few days.  Patient's response to treatment:  Patient is engaged, reflective, and motivated   She participates fully in sessions and is appearing to benefit from treatment.    Plan: Ongoing individual psychotherapy     Current mental health status:   General appearance:  Appropriate, well kept   Mood: Hurt, frustrated   Affect: Mood  Congruent   Cognition: Appropriate for age   Orientation: Times three   Insight: fair to good   Memory: Appropriate long term / Short term   Psychosis: Denies   Suicidal / Homicidal Thoughts: Denies    Goals and Interventions:   Problem Goals   Mood Disorder  Depression  Anxiety   [x] Decrease symptoms  [x] Improve well being  [x] Improve coping  [] Change behavior/cognitions  [] Improve psychosocial support  [] Improve decision making  [] Improve self-care with diet and exercise  [] Improve sleep habits/sleep quality  [] Monitor psychological status     Interventions of Treatment   [x] Fostered healthy therapeutic alliance  [] Addressed unhealthy cognitions  [] Addressed unhealthy behaviors  [] Relaxation training  [x] Psycho-education  [] Bibliotherapy  [] Provided support  [x] Explored/confronted resistance  [x] Developed insights into cognitions/behaviors  [] Identified/Promoted adaptive coping strategies  [] Education/training in sleep   [x] Education/training in mindfulness tools       History of Presenting Complaint from Intake:   Christie Negron reports that she began experiencing symptoms of anxiety at the end of her relationship last Spring and Summer.  At that time she was realizing how dysfunctional it had become and the serious mental health issues her girlfriend had.  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        Diagnosis:    Axis I: Anxiety disorder, unspecified   Axis II: deferred   Axis III: none reported   Axis IV: interpersonal conflict, professional uncertainty          Ashish Rivas, PhD  Licensed Psychologist  Pager: [phone]

## 2019-07-11 ENCOUNTER — OFFICE VISIT (OUTPATIENT)
Dept: PSYCHOLOGY | Facility: CLINIC | Age: 24
End: 2019-07-11
Payer: COMMERCIAL

## 2019-07-11 DIAGNOSIS — F41.9 ANXIETY DISORDER, UNSPECIFIED TYPE: Primary | ICD-10-CM

## 2019-07-25 ENCOUNTER — OFFICE VISIT (OUTPATIENT)
Dept: PSYCHOLOGY | Facility: CLINIC | Age: 24
End: 2019-07-25
Payer: COMMERCIAL

## 2019-07-25 DIAGNOSIS — F43.22 ADJUSTMENT DISORDER WITH ANXIETY: Primary | ICD-10-CM

## 2019-09-12 NOTE — PROGRESS NOTES
Health Psychology  Psychologists:     Lourdes Angel, Ph.D., L.P. [phone]                  Ashish Rivas, Ph.D.,  L.P. [phone]    Emely Peters, Ph.D., L.P. [phone]     Adam Acuna, Ph.D., A.B.P.P., L.P. [phone]    Miranda Matta, Ph.D., L.P. [phone]          Mailing Address:   Department of Medicine  [facility]  [facility] [address] [place] [address]   Clinical Office:   [facility]  [address] [place] [address]             Health Psychology  Confidential Summary of Treatment    Patient: Christie Negron  Patient's YOB: 1995  MRN: [id number]  Psychologist: Ashish Rivas, PhD,   Psychological Evaluation Date: January 17, 2019  Confidential Visit Summary    Service: Individual Psychotherapy     Start time:  10:03  Stop time:  11:00  Treatment modality:   Cognitive Behavioral Psychotherapy   Insight Oriented Psychotherapy   Patient s progress on goals:   Met patient in the lobby and escorted to the consultation room for visit.   Sarika reported that she is feeling more confident in her roles at work and in her personal life.  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  Feeling overall healthier.    Discussed issues of self-care and healthy boundaries.  She will schedule and appointment as she deems helpful moving forward.   Patient's response to treatment:  Patient is engaged, reflective, and motivated   She participates fully in sessions and reports benefiting from treatment.    Plan: Individual psychotherapy as needed    Current mental health status:   General appearance:  Appropriate, well kept   Mood: Good   Affect: Mood congruent   Cognition: Appropriate for age   Orientation: Times  three   Insight: fair to good   Memory: Appropriate long term / Short term   Psychosis: Denies   Suicidal / Homicidal Thoughts: Denies    Goals and Interventions:   Problem Goals   Mood Disorder  Depression  Anxiety   [x] Decrease symptoms  [x] Improve well being  [x] Improve coping  [] Change behavior/cognitions  [] Improve psychosocial support  [] Improve decision making  [] Improve self-care with diet and exercise  [] Improve sleep habits/sleep quality  [] Monitor psychological status     Interventions of Treatment   [x] Fostered healthy therapeutic alliance  [] Addressed unhealthy cognitions  [] Addressed unhealthy behaviors  [] Relaxation training  [x] Psycho-education  [] Bibliotherapy  [] Provided support  [x] Explored/confronted resistance  [x] Developed insights into cognitions/behaviors  [] Identified/Promoted adaptive coping strategies  [] Education/training in sleep   [x] Education/training in mindfulness tools       History of Presenting Complaint from Intake:   Christie Negron reports that she began experiencing symptoms of anxiety at the end of her relationship last Spring and Summer.  At that time she was realizing how dysfunctional it had become and the serious mental health issues her girlfriend had.  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        Diagnosis:    Axis I: Anxiety disorder, unspecified   Axis II: deferred   Axis III: none reported   Axis IV: interpersonal conflict, professional uncertainty          Ashish Rivas, PhD  Licensed Psychologist  Pager: [phone]

## 2019-09-12 NOTE — PROGRESS NOTES
Health Psychology  Psychologists:     Lourdes Angel, Ph.D., L.P. [phone]                  Ashish Rivas, Ph.D.,  L.P. [phone]    Emely Peters, Ph.D., L.P. [phone]     Adam Acuna, Ph.D., A.B.P.P., L.P. [phone]    Miranda Matta, Ph.D., L.P. [phone]          Mailing Address:   Department of Medicine  [facility] [employer]  [facility] [address] [place] [address]   Clinical Office:   [facility]  [address] [place] [address]             Health Psychology  Confidential Summary of Treatment    Patient: Christie Negron  Patient's YOB: 1995  MRN: [id number]  Psychologist: Ashish Rivas, PhD,   Psychological Evaluation Date: January 17, 2019  Confidential Visit Summary    Service: Individual Psychotherapy     Start time:  11:07  Stop time:  12:00  Treatment modality:   Cognitive Behavioral Psychotherapy   Insight Oriented Psychotherapy   Patient s progress on goals:   Met patient in the lobby and escorted to the consultation room for visit.   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  Talked about the job she will be starting later this summer.    Patient's response to treatment:  Patient is engaged, reflective, and motivated   She participates fully in sessions and reports benefiting from treatment.    Plan: Individual psychotherapy as needed    Current mental health status:   General appearance:  Appropriate, well kept   Mood: More confident, Good   Affect: Mood congruent   Cognition: Appropriate for age   Orientation: Times three   Insight: fair to good   Memory: Appropriate long term / Short term   Psychosis: Denies   Suicidal / Homicidal Thoughts: Denies    Goals and Interventions:   Problem Goals   Mood Disorder  Depression  Anxiety   [x] Decrease symptoms  [x]  Improve well being  [x] Improve coping  [] Change behavior/cognitions  [] Improve psychosocial support  [] Improve decision making  [] Improve self-care with diet and exercise  [] Improve sleep habits/sleep quality  [] Monitor psychological status     Interventions of Treatment   [x] Fostered healthy therapeutic alliance  [] Addressed unhealthy cognitions  [] Addressed unhealthy behaviors  [] Relaxation training  [x] Psycho-education  [] Bibliotherapy  [] Provided support  [x] Explored/confronted resistance  [x] Developed insights into cognitions/behaviors  [] Identified/Promoted adaptive coping strategies  [] Education/training in sleep   [x] Education/training in mindfulness tools       History of Presenting Complaint from Intake:   Christie Negron reports that she began experiencing symptoms of anxiety at the end of her relationship last Spring and Summer.  At that time she was realizing how dysfunctional it had become and the serious mental health issues her girlfriend had.  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        Diagnosis:    Axis I: Anxiety disorder, unspecified   Axis II: deferred   Axis III: none reported   Axis IV: interpersonal conflict, professional uncertainty          Ashish Rivas, PhD  Licensed Psychologist  Pager: [phone]